# Patient Record
Sex: MALE | Race: WHITE | NOT HISPANIC OR LATINO | Employment: OTHER | URBAN - METROPOLITAN AREA
[De-identification: names, ages, dates, MRNs, and addresses within clinical notes are randomized per-mention and may not be internally consistent; named-entity substitution may affect disease eponyms.]

---

## 2017-06-07 ENCOUNTER — OFFICE VISIT (OUTPATIENT)
Dept: CARDIOLOGY | Facility: CLINIC | Age: 77
End: 2017-06-07

## 2017-06-07 VITALS
SYSTOLIC BLOOD PRESSURE: 128 MMHG | DIASTOLIC BLOOD PRESSURE: 64 MMHG | BODY MASS INDEX: 27.61 KG/M2 | WEIGHT: 186.4 LBS | HEART RATE: 70 BPM | HEIGHT: 69 IN

## 2017-06-07 DIAGNOSIS — E78.00 PURE HYPERCHOLESTEROLEMIA: ICD-10-CM

## 2017-06-07 DIAGNOSIS — I10 ESSENTIAL HYPERTENSION: ICD-10-CM

## 2017-06-07 DIAGNOSIS — I25.10 CORONARY ARTERY DISEASE INVOLVING NATIVE CORONARY ARTERY OF NATIVE HEART WITHOUT ANGINA PECTORIS: Primary | ICD-10-CM

## 2017-06-07 PROCEDURE — 99213 OFFICE O/P EST LOW 20 MIN: CPT | Performed by: INTERNAL MEDICINE

## 2017-06-07 RX ORDER — FLUTICASONE PROPIONATE 50 MCG
2 SPRAY, SUSPENSION (ML) NASAL DAILY
COMMUNITY

## 2017-06-07 RX ORDER — CETIRIZINE HYDROCHLORIDE 10 MG/1
10 TABLET ORAL DAILY
COMMUNITY

## 2017-06-07 RX ORDER — ASPIRIN 325 MG
81 TABLET ORAL DAILY
COMMUNITY
End: 2018-06-13 | Stop reason: DRUGHIGH

## 2017-06-07 NOTE — PROGRESS NOTES
Subjective:     Encounter Date:06/07/2017      Patient ID: Jimi Garcia is a 77 y.o. male.    Chief Complaint: No chief complaint on file.      PROBLEM LIST:  1. Coronary artery disease  a. A 7/ 2001 angina cardiac catheter 70% left main 60% LAD 90% left circumflex EF 45  b. 8/01 CABG ([facility]) LIMA to LAD vein graft to diagonal vein graft to OM1 and SVG to PDA  c. 10/01 cardiac catheter normal LVEF occluded SVG to diagonal.  95% diagonal (2.5 x 9 RIVERA)  d. Angina, 3/7, patent LIMA to LAD, SVG ×2 occluded ostial LCx 90% (4 x 15 ) obtuse marginal 90% (3.0 x 12 ) 99% left PDA treated medically  e. 4/11 cardiac catheter 100% RCA and 100% SVG's.  Patent LIMA.  Patent stents.  f. 4/13 patent LIMA 100% ramus with collaterals 100% SVGs.  100% RCA with collaterals LVEF 55  2. Hypertension  3. Diabetes  4. Dyslipidemia  5. Low testosterone  6. Alzheimer's dementia  7. GERD  8. Past surgical history  Deviated septum repair   lumbar spine surgery      History of Present Illness  Patient returns today for follow up with a history of cor artery disease.  He is seen for his annual follow-up and overall from a cardiovascular standpoint.  Doing well.  He has no chest pain shortness breath orthopnea PND claudication any cardio vascular symptoms.  His main issues per his wife or his intermittent memory issues from Alzheimer's..     Allergies   Allergen Reactions   • Aricept [Donepezil Hcl]    • Cefdinir    • Doxycycline Nausea Only   • Sulfa Antibiotics    • Vibramycin [Doxycycline Calcium] Nausea Only         Current Outpatient Prescriptions:   •  aspirin 325 MG tablet, Take 325 mg by mouth Daily., Disp: , Rfl:   •  atorvastatin (LIPITOR) 20 MG tablet, Take 20 mg by mouth daily., Disp: , Rfl:   •  cetirizine (zyrTEC) 10 MG tablet, Take 10 mg by mouth Daily., Disp: , Rfl:   •  clopidogrel (PLAVIX) 75 MG tablet, Take 75 mg by mouth daily., Disp: , Rfl:   •  Cyanocobalamin 1000 MCG/ML kit, Inject  as directed Every 30  "(Thirty) Days., Disp: , Rfl:   •  fluticasone (FLONASE) 50 MCG/ACT nasal spray, 2 sprays into each nostril Daily., Disp: , Rfl:   •  lisinopril (PRINIVIL,ZESTRIL) 5 MG tablet, Take 5 mg by mouth daily., Disp: , Rfl:   •  memantine (NAMENDA XR) 28 MG capsule sustained-release 24 hr extended release capsule, Take 28 mg by mouth daily., Disp: , Rfl:   •  metFORMIN (GLUCOPHAGE) 1000 MG tablet, Take 1,000 mg by mouth 2 (two) times a day with meals., Disp: , Rfl:   •  metoprolol tartrate (LOPRESSOR) 50 MG tablet, Take 50 mg by mouth 2 (two) times a day., Disp: , Rfl:   •  Multiple Vitamins-Minerals (MULTIVITAMIN ADULTS PO), Take  by mouth., Disp: , Rfl:   •  nitroglycerin (NITROSTAT) 0.4 MG SL tablet, Place 0.4 mg under the tongue every 5 (five) minutes as needed for chest pain. Take no more than 3 doses in 15 minutes., Disp: , Rfl:   •  omeprazole (PriLOSEC) 40 MG capsule, Take 40 mg by mouth 2 (two) times a day., Disp: , Rfl:   •  saxagliptin (ONGLYZA) 5 MG tablet, Take 5 mg by mouth daily., Disp: , Rfl:   •  TACRINE HYDROCHLORIDE PO, Take  by mouth., Disp: , Rfl:     The following portions of the patient's history were reviewed and updated as appropriate: allergies, current medications, past family history, past medical history, past social history, past surgical history and problem list.    Review of Systems   Constitution: Negative.   Cardiovascular: Negative.    Respiratory: Negative.    Hematologic/Lymphatic: Negative for bleeding problem. Does not bruise/bleed easily.   Skin: Negative for rash.   Musculoskeletal: Positive for arthritis. Negative for muscle weakness and myalgias.   Gastrointestinal: Negative for heartburn, nausea and vomiting.   Neurological: Negative.    Psychiatric/Behavioral: Positive for memory loss.          Objective:   Blood pressure 128/64, pulse 70, height 69\" (175.3 cm), weight 186 lb 6.4 oz (84.6 kg).      Physical Exam   Constitutional: He is oriented to person, place, and time. He " appears well-developed and well-nourished.   HENT:   Mouth/Throat: Oropharynx is clear and moist.   Neck: No JVD present. Carotid bruit is not present. No thyromegaly present.   Cardiovascular: Regular rhythm, S1 normal, S2 normal, normal heart sounds and intact distal pulses.  Exam reveals no gallop, no S3 and no S4.    No murmur heard.  Pulses:       Carotid pulses are 2+ on the right side, and 2+ on the left side.       Radial pulses are 2+ on the right side, and 2+ on the left side.   Pulmonary/Chest: Breath sounds normal.   Abdominal: Soft. Bowel sounds are normal. He exhibits no mass. There is no tenderness.   Musculoskeletal: He exhibits no edema.   Neurological: He is alert and oriented to person, place, and time.   Skin: Skin is warm and dry. No rash noted.       Lab Review:    Procedures        Assessment:   Diagnoses and all orders for this visit:    Coronary artery disease involving native coronary artery of native heart without angina pectoris    Pure hypercholesterolemia    Essential hypertension      Impression  1. Coronary artery disease, stable, known severe disease, but no angina  2. Alzheimer's type dementia, this is his primary issue  3. Hypertension controlled  4. Dyslipidemia controlled on statin    Plan:  1. Continue current medical therapy  2. Revisit in 12 MO, or sooner as needed.    Tim Byrd MD

## 2018-06-11 NOTE — PROGRESS NOTES
Subjective:     Encounter Date:06/13/2018    Primary Care Physician: Dick Valero MD      Patient ID: Jimi Garcia is a 78 y.o. male.    Chief Complaint:No chief complaint on file.    PROBLEM LIST:  1. Coronary artery disease  a. A 7/ 2001 angina cardiac catheter 70% left main 60% LAD 90% left circumflex EF 45  b. 8/01 CABG ([facility]) LIMA to LAD vein graft to diagonal vein graft to OM1 and SVG to PDA  c. 10/01 cardiac catheter normal LVEF occluded SVG to diagonal.  95% diagonal (2.5 x 9 RIVERA)  d. Angina, 3/7, patent LIMA to LAD, SVG ×2 occluded ostial LCx 90% (4 x 15 ) obtuse marginal 90% (3.0 x 12 ) 99% left PDA treated medically  e. 4/11 cardiac catheter 100% RCA and 100% SVG's.  Patent LIMA.  Patent stents.  f. 4/13 patent LIMA 100% ramus with collaterals 100% SVGs.  100% RCA with collaterals LVEF 55  2. Hypertension  3. Diabetes  4. Dyslipidemia  5. Low testosterone  6. Alzheimer's dementia  7. GERD  8. Arthritis   9. Past surgical history:  a. Deviated septum repair  b. Lumbar spine surgery    Allergies   Allergen Reactions   • Aricept [Donepezil Hcl]    • Cefdinir    • Doxycycline Nausea Only   • Sulfa Antibiotics    • Vibramycin [Doxycycline Calcium] Nausea Only         Current Outpatient Prescriptions:   •  aspirin 325 MG tablet, Take 325 mg by mouth Daily., Disp: , Rfl:   •  atorvastatin (LIPITOR) 20 MG tablet, Take 20 mg by mouth daily., Disp: , Rfl:   •  cetirizine (zyrTEC) 10 MG tablet, Take 10 mg by mouth Daily., Disp: , Rfl:   •  clopidogrel (PLAVIX) 75 MG tablet, Take 75 mg by mouth daily., Disp: , Rfl:   •  Cyanocobalamin 1000 MCG/ML kit, Inject  as directed Every 30 (Thirty) Days., Disp: , Rfl:   •  fluticasone (FLONASE) 50 MCG/ACT nasal spray, 2 sprays into each nostril Daily., Disp: , Rfl:   •  lisinopril (PRINIVIL,ZESTRIL) 5 MG tablet, Take 5 mg by mouth daily., Disp: , Rfl:   •  memantine (NAMENDA XR) 28 MG capsule sustained-release 24 hr extended release capsule, Take 28 mg by  mouth daily., Disp: , Rfl:   •  metFORMIN (GLUCOPHAGE) 1000 MG tablet, Take 1,000 mg by mouth 2 (two) times a day with meals., Disp: , Rfl:   •  metoprolol tartrate (LOPRESSOR) 50 MG tablet, Take 50 mg by mouth 2 (two) times a day., Disp: , Rfl:   •  Multiple Vitamins-Minerals (MULTIVITAMIN ADULTS PO), Take  by mouth., Disp: , Rfl:   •  nitroglycerin (NITROSTAT) 0.4 MG SL tablet, Place 0.4 mg under the tongue every 5 (five) minutes as needed for chest pain. Take no more than 3 doses in 15 minutes., Disp: , Rfl:   •  omeprazole (PriLOSEC) 40 MG capsule, Take 40 mg by mouth 2 (two) times a day., Disp: , Rfl:   •  saxagliptin (ONGLYZA) 5 MG tablet, Take 5 mg by mouth daily., Disp: , Rfl:   •  TACRINE HYDROCHLORIDE PO, Take  by mouth., Disp: , Rfl:         History of Present Illness    Patient returns today for annual follow-up was coronary artery disease.  Since her last visit he is overall doing very well.  He has no change in his cardiovascular complaints.  Still notes his primary issue his memory pain orthopnea PND dyspnea on exertion presyncope syncope or tachycardia palpitations.    The following portions of the patient's history were reviewed and updated as appropriate: allergies, current medications, past family history, past medical history, past social history, past surgical history and problem list.      Social History   Substance Use Topics   • Smoking status: Former Smoker     Quit date: 1990   • Smokeless tobacco: Never Used      Comment: quit 1990   • Alcohol use No         Review of Systems   Constitution: Negative.   Cardiovascular: Negative.    Respiratory: Negative.    Hematologic/Lymphatic: Negative for bleeding problem. Does not bruise/bleed easily.   Skin: Negative for rash.   Musculoskeletal: Positive for arthritis. Negative for muscle weakness and myalgias.   Gastrointestinal: Negative for heartburn, nausea and vomiting.   Neurological: Negative.    Psychiatric/Behavioral: Positive for memory  loss.          Objective:    vitals were not taken for this visit.        Physical Exam   Constitutional: He is oriented to person, place, and time. He appears well-developed and well-nourished.   HENT:   Mouth/Throat: Oropharynx is clear and moist.   Neck: No JVD present. Carotid bruit is not present. No thyromegaly present.   Cardiovascular: Regular rhythm, S1 normal, S2 normal, normal heart sounds and intact distal pulses.  Exam reveals no gallop, no S3 and no S4.    No murmur heard.  Pulses:       Carotid pulses are 2+ on the right side, and 2+ on the left side.       Radial pulses are 2+ on the right side, and 2+ on the left side.   Pulmonary/Chest: Breath sounds normal.   Abdominal: Soft. Bowel sounds are normal. He exhibits no mass. There is no tenderness.   Musculoskeletal: He exhibits no edema.   Neurological: He is alert and oriented to person, place, and time.   Skin: Skin is warm and dry. No rash noted.       Procedures          Assessment:   Assessment/Plan      Jimi was seen today for coronary artery disease, hyperlipidemia and hypertension.    Diagnoses and all orders for this visit:    Coronary artery disease involving native coronary artery of native heart without angina pectoris    Pure hypercholesterolemia      1.  Coronary artery disease stable no angina  2.  Hypertension well controlled, blood pressure is somewhat low today.  3.  Dyslipidemia on statin  .  Mild Alzheimer's type dementia    Recommendations: Decrease aspirin 81 mg daily.  Given the patient's age, comorbidities, and relatively hypotension, we will discontinue his lisinopril.  No other changes, revisit annually or when necessary symptom change     Tim Byrd MD      Dictated utilizing Dragon dictation

## 2018-06-13 ENCOUNTER — OFFICE VISIT (OUTPATIENT)
Dept: CARDIOLOGY | Facility: CLINIC | Age: 78
End: 2018-06-13

## 2018-06-13 VITALS
BODY MASS INDEX: 27.47 KG/M2 | DIASTOLIC BLOOD PRESSURE: 56 MMHG | WEIGHT: 186 LBS | SYSTOLIC BLOOD PRESSURE: 106 MMHG | HEART RATE: 64 BPM

## 2018-06-13 DIAGNOSIS — I25.10 CORONARY ARTERY DISEASE INVOLVING NATIVE CORONARY ARTERY OF NATIVE HEART WITHOUT ANGINA PECTORIS: Primary | ICD-10-CM

## 2018-06-13 DIAGNOSIS — E78.00 PURE HYPERCHOLESTEROLEMIA: ICD-10-CM

## 2018-06-13 PROCEDURE — 99213 OFFICE O/P EST LOW 20 MIN: CPT | Performed by: INTERNAL MEDICINE

## 2018-06-13 RX ORDER — ASPIRIN 81 MG/1
81 TABLET ORAL DAILY
Qty: 30 TABLET | Refills: 11
Start: 2018-06-13

## 2019-07-01 NOTE — PROGRESS NOTES
Subjective:     Encounter Date:07/02/2019    Primary Care Physician: Dick Valero MD      Patient ID: Jimi Garcia is a 79 y.o. male.    Chief Complaint:Follow-up    PROBLEM LIST:  1. Coronary artery disease  a. A 7/ 2001 angina cardiac catheter 70% left main 60% LAD 90% left circumflex EF 45  b. 8/01 CABG ([facility]) LIMA to LAD vein graft to diagonal vein graft to OM1 and SVG to PDA  c. 10/01 cardiac catheter normal LVEF occluded SVG to diagonal.  95% diagonal (2.5 x 9 RIVERA)  d. Angina, 3/7, patent LIMA to LAD, SVG ×2 occluded ostial LCx 90% (4 x 15 ) obtuse marginal 90% (3.0 x 12 ) 99% left PDA treated medically  e. 4/11 cardiac catheter 100% RCA and 100% SVG's.  Patent LIMA.  Patent stents.  f. 4/13 patent LIMA 100% ramus with collaterals 100% SVGs.  100% RCA with collaterals LVEF 55  2. Hypertension  3. Diabetes  4. Dyslipidemia  5. Low testosterone  6. Alzheimer's dementia  7. GERD  8. Arthritis   9. Past surgical history:  a. Deviated septum repair  b. Lumbar spine surgery      Allergies   Allergen Reactions   • Aricept [Donepezil Hcl]    • Cefdinir    • Doxycycline Nausea Only   • Sulfa Antibiotics    • Vibramycin [Doxycycline Calcium] Nausea Only         Current Outpatient Medications:   •  aspirin 81 MG EC tablet, Take 1 tablet by mouth Daily., Disp: 30 tablet, Rfl: 11  •  atorvastatin (LIPITOR) 20 MG tablet, Take 20 mg by mouth daily., Disp: , Rfl:   •  cetirizine (zyrTEC) 10 MG tablet, Take 10 mg by mouth Daily., Disp: , Rfl:   •  clopidogrel (PLAVIX) 75 MG tablet, Take 75 mg by mouth daily., Disp: , Rfl:   •  Cyanocobalamin 1000 MCG/ML kit, Inject  as directed Every 30 (Thirty) Days., Disp: , Rfl:   •  fluticasone (FLONASE) 50 MCG/ACT nasal spray, 2 sprays into each nostril Daily., Disp: , Rfl:   •  memantine (NAMENDA XR) 28 MG capsule sustained-release 24 hr extended release capsule, Take 28 mg by mouth daily., Disp: , Rfl:   •  metFORMIN (GLUCOPHAGE) 1000 MG tablet, Take 1,000 mg by  mouth 2 (two) times a day with meals., Disp: , Rfl:   •  metoprolol tartrate (LOPRESSOR) 50 MG tablet, Take 50 mg by mouth 2 (two) times a day., Disp: , Rfl:   •  Multiple Vitamins-Minerals (MULTIVITAMIN ADULTS PO), Take  by mouth., Disp: , Rfl:   •  nitroglycerin (NITROSTAT) 0.4 MG SL tablet, Place 0.4 mg under the tongue every 5 (five) minutes as needed for chest pain. Take no more than 3 doses in 15 minutes., Disp: , Rfl:   •  pantoprazole (PROTONIX) 40 MG EC tablet, Take 40 mg by mouth Daily., Disp: , Rfl:   •  psyllium (METAMUCIL) 58.6 % packet, Take 1 packet by mouth Daily., Disp: , Rfl:   •  saxagliptin (ONGLYZA) 5 MG tablet, Take 5 mg by mouth daily., Disp: , Rfl:         History of Present Illness    Returns today for annual follow-up of coronary disease hypertension and dyslipidemia.  Since her last visit she has no new cardiovascular issues.  He feels well.  Denies shortness of breath chest pain exertional claudication or any cardiovascular symptoms.     The following portions of the patient's history were reviewed and updated as appropriate: allergies, current medications, past family history, past medical history, past social history, past surgical history and problem list.      Social History     Tobacco Use   • Smoking status: Former Smoker     Last attempt to quit:      Years since quittin.5   • Smokeless tobacco: Never Used   • Tobacco comment: quit    Substance Use Topics   • Alcohol use: No   • Drug use: No         Review of Systems   Constitution: Negative.   Cardiovascular: Negative.    Respiratory: Negative.    Hematologic/Lymphatic: Negative for bleeding problem. Bruises/bleeds easily.   Skin: Positive for skin cancer and suspicious lesions. Negative for rash.   Musculoskeletal: Negative for muscle weakness and myalgias.   Gastrointestinal: Negative for heartburn, nausea and vomiting.   Neurological: Positive for difficulty with concentration.   Psychiatric/Behavioral: Positive  for hypervigilance.          Objective:    weight is 82.1 kg (181 lb). His blood pressure is 122/50 and his pulse is 75. His oxygen saturation is 97%.         Physical Exam   Constitutional: He is oriented to person, place, and time. He appears well-developed and well-nourished.   HENT:   Mouth/Throat: Oropharynx is clear and moist.   Neck: No JVD present. Carotid bruit is not present. No thyromegaly present.   Cardiovascular: Regular rhythm, S1 normal, S2 normal, normal heart sounds and intact distal pulses. Exam reveals no gallop, no S3 and no S4.   No murmur heard.  Pulses:       Carotid pulses are 2+ on the right side, and 2+ on the left side.       Radial pulses are 2+ on the right side, and 2+ on the left side.   Pulmonary/Chest: Breath sounds normal.   Abdominal: Soft. Bowel sounds are normal. He exhibits no mass. There is no tenderness.   Musculoskeletal: He exhibits no edema.   Neurological: He is alert and oriented to person, place, and time.   Skin: Skin is warm and dry. No rash noted.       Procedures          Assessment:   Assessment/Plan      Jimi was seen today for follow-up.    Diagnoses and all orders for this visit:    Coronary artery disease involving native coronary artery of native heart without angina pectoris    Pure hypercholesterolemia    Essential hypertension      1.  Coronary artery disease, status post remote CABG.  Currently without angina.  We will continue current medical therapy.  2.  Hypertension well-controlled on current medical therapy will make no changes  3.  Dyslipidemia last LDL of 62, continue current statin dose  4.  Diabetes last hemoglobin A1c is 7.3  5.  Alzheimer's type dementia, slowly progressing.    Recommendations overall the patient is stable today.  We will make no changes medical therapy.  Continue to revisit annually or as needed symptom change     Tim Byrd MD      Dictated utilizing Dragon dictation

## 2019-07-02 ENCOUNTER — OFFICE VISIT (OUTPATIENT)
Dept: CARDIOLOGY | Facility: CLINIC | Age: 79
End: 2019-07-02

## 2019-07-02 VITALS
DIASTOLIC BLOOD PRESSURE: 50 MMHG | WEIGHT: 181 LBS | SYSTOLIC BLOOD PRESSURE: 122 MMHG | OXYGEN SATURATION: 97 % | HEART RATE: 75 BPM | BODY MASS INDEX: 26.73 KG/M2

## 2019-07-02 DIAGNOSIS — I25.10 CORONARY ARTERY DISEASE INVOLVING NATIVE CORONARY ARTERY OF NATIVE HEART WITHOUT ANGINA PECTORIS: Primary | ICD-10-CM

## 2019-07-02 DIAGNOSIS — I10 ESSENTIAL HYPERTENSION: ICD-10-CM

## 2019-07-02 DIAGNOSIS — E78.00 PURE HYPERCHOLESTEROLEMIA: ICD-10-CM

## 2019-07-02 PROCEDURE — 99213 OFFICE O/P EST LOW 20 MIN: CPT | Performed by: INTERNAL MEDICINE

## 2019-07-02 RX ORDER — PANTOPRAZOLE SODIUM 40 MG/1
40 TABLET, DELAYED RELEASE ORAL DAILY
COMMUNITY

## 2020-07-15 ENCOUNTER — OFFICE VISIT (OUTPATIENT)
Dept: CARDIOLOGY | Facility: CLINIC | Age: 80
End: 2020-07-15

## 2020-07-15 VITALS
BODY MASS INDEX: 28.82 KG/M2 | DIASTOLIC BLOOD PRESSURE: 50 MMHG | HEIGHT: 65 IN | HEART RATE: 60 BPM | SYSTOLIC BLOOD PRESSURE: 102 MMHG | WEIGHT: 173 LBS

## 2020-07-15 DIAGNOSIS — I10 ESSENTIAL HYPERTENSION: ICD-10-CM

## 2020-07-15 DIAGNOSIS — E78.00 PURE HYPERCHOLESTEROLEMIA: ICD-10-CM

## 2020-07-15 DIAGNOSIS — I25.10 CORONARY ARTERY DISEASE INVOLVING NATIVE CORONARY ARTERY OF NATIVE HEART WITHOUT ANGINA PECTORIS: Primary | ICD-10-CM

## 2020-07-15 PROCEDURE — 99213 OFFICE O/P EST LOW 20 MIN: CPT | Performed by: INTERNAL MEDICINE

## 2020-07-15 RX ORDER — PHENOL 1.4 %
600 AEROSOL, SPRAY (ML) MUCOUS MEMBRANE DAILY
COMMUNITY

## 2020-07-15 RX ORDER — PAROXETINE 10 MG/1
10 TABLET, FILM COATED ORAL NIGHTLY
COMMUNITY

## 2020-07-15 RX ORDER — LANOLIN ALCOHOL/MO/W.PET/CERES
1000 CREAM (GRAM) TOPICAL DAILY
COMMUNITY

## 2020-07-15 NOTE — PROGRESS NOTES
Subjective:     Encounter Date:07/15/2020    Primary Care Physician: Dick Valero MD      Patient ID: Jimi Garcia is a 80 y.o. male.    Chief Complaint:Follow-up    PROBLEM LIST:  1. Coronary artery disease  a. A 7/ 2001 angina cardiac catheter 70% left main 60% LAD 90% left circumflex EF 45  b. 8/01 CABG ([facility]) LIMA to LAD vein graft to diagonal vein graft to OM1 and SVG to PDA  c. 10/01 cardiac catheter normal LVEF occluded SVG to diagonal.  95% diagonal (2.5 x 9 RIVERA)  d. Angina, 3/7, patent LIMA to LAD, SVG ×2 occluded ostial LCx 90% (4 x 15 ) obtuse marginal 90% (3.0 x 12 ) 99% left PDA treated medically  e. 4/11 cardiac catheter 100% RCA and 100% SVG's.  Patent LIMA.  Patent stents.  f. 4/13 patent LIMA 100% ramus with collaterals 100% SVGs.  100% RCA with collaterals LVEF 55  2. Hypertension  3. Diabetes  4. Dyslipidemia  5. Low testosterone  6. Alzheimer's dementia  7. GERD  8. Arthritis   9. Past surgical history:  a. Deviated septum repair  b. Lumbar spine surgery      Allergies   Allergen Reactions   • Aricept [Donepezil Hcl]    • Cefdinir    • Doxycycline Nausea Only   • Sulfa Antibiotics    • Vibramycin [Doxycycline Calcium] Nausea Only         Current Outpatient Medications:   •  aspirin 81 MG EC tablet, Take 1 tablet by mouth Daily., Disp: 30 tablet, Rfl: 11  •  atorvastatin (LIPITOR) 20 MG tablet, Take 20 mg by mouth daily., Disp: , Rfl:   •  calcium carbonate (OS-ANNA) 600 MG tablet, Take 600 mg by mouth Daily., Disp: , Rfl:   •  cetirizine (zyrTEC) 10 MG tablet, Take 10 mg by mouth Daily., Disp: , Rfl:   •  clopidogrel (PLAVIX) 75 MG tablet, Take 75 mg by mouth daily., Disp: , Rfl:   •  fluticasone (FLONASE) 50 MCG/ACT nasal spray, 2 sprays into each nostril Daily., Disp: , Rfl:   •  memantine (NAMENDA XR) 28 MG capsule sustained-release 24 hr extended release capsule, Take 28 mg by mouth daily., Disp: , Rfl:   •  metFORMIN (GLUCOPHAGE) 1000 MG tablet, Take 1,000 mg by mouth 2  (two) times a day with meals., Disp: , Rfl:   •  metoprolol tartrate (LOPRESSOR) 50 MG tablet, Take 50 mg by mouth 2 (two) times a day., Disp: , Rfl:   •  Multiple Vitamins-Minerals (MULTIVITAMIN ADULTS PO), Take  by mouth., Disp: , Rfl:   •  nitroglycerin (NITROSTAT) 0.4 MG SL tablet, Place 0.4 mg under the tongue every 5 (five) minutes as needed for chest pain. Take no more than 3 doses in 15 minutes., Disp: , Rfl:   •  pantoprazole (PROTONIX) 40 MG EC tablet, Take 40 mg by mouth Daily., Disp: , Rfl:   •  PARoxetine (PAXIL) 10 MG tablet, Take 10 mg by mouth Every Night., Disp: , Rfl:   •  psyllium (METAMUCIL) 58.6 % packet, Take 1 packet by mouth Daily., Disp: , Rfl:   •  saxagliptin (ONGLYZA) 5 MG tablet, Take 5 mg by mouth daily., Disp: , Rfl:   •  vitamin B-12 (CYANOCOBALAMIN) 1000 MCG tablet, Take 1,000 mcg by mouth Daily., Disp: , Rfl:   •  Cyanocobalamin 1000 MCG/ML kit, Inject  as directed Every 30 (Thirty) Days., Disp: , Rfl:         History of Present Illness    Patient returns today for follow-up of coronary artery disease and coronary risk factors.  Since her last visit, overall is doing very well.  Is wife notes that he has had some continued progressive cognitive decline.  He has an occasional dizziness, particularly wakes up in the morning, and after his morning medications.  Denies any presyncope or syncope.  Denies chest pain or dyspnea.    The following portions of the patient's history were reviewed and updated as appropriate: allergies, current medications, past family history, past medical history, past social history, past surgical history and problem list.      Social History     Tobacco Use   • Smoking status: Former Smoker     Last attempt to quit:      Years since quittin.5   • Smokeless tobacco: Never Used   • Tobacco comment: quit    Substance Use Topics   • Alcohol use: No   • Drug use: No         Review of Systems   Constitution: Negative.   Cardiovascular: Negative.   "Negative for chest pain, dyspnea on exertion, leg swelling, palpitations and syncope.   Respiratory: Negative.  Negative for shortness of breath.    Hematologic/Lymphatic: Negative for bleeding problem. Does not bruise/bleed easily.   Skin: Negative for rash.   Musculoskeletal: Negative for muscle weakness and myalgias.   Gastrointestinal: Negative for heartburn, nausea and vomiting.   Neurological: Negative.  Negative for dizziness, light-headedness, loss of balance and numbness.   Psychiatric/Behavioral: Positive for memory loss.          Objective:   /50 (BP Location: Right arm)   Pulse 60   Ht 165.1 cm (65\")   Wt 78.5 kg (173 lb)   BMI 28.79 kg/m²         Physical Exam   Constitutional: He is oriented to person, place, and time. He appears well-developed and well-nourished.   HENT:   Mouth/Throat: Oropharynx is clear and moist.   Neck: No JVD present. Carotid bruit is not present. No thyromegaly present.   Cardiovascular: Regular rhythm, S1 normal, S2 normal, normal heart sounds and intact distal pulses. Exam reveals no gallop, no S3 and no S4.   No murmur heard.  Pulses:       Carotid pulses are 2+ on the right side, and 2+ on the left side.       Radial pulses are 2+ on the right side, and 2+ on the left side.   Pulmonary/Chest: Breath sounds normal.   Abdominal: Soft. Bowel sounds are normal. He exhibits no mass. There is no tenderness.   Musculoskeletal: He exhibits no edema.   Neurological: He is alert and oriented to person, place, and time.   Skin: Skin is warm and dry. No rash noted.       Procedures          Assessment:   Assessment/Plan      Jimi was seen today for follow-up.    Diagnoses and all orders for this visit:    Coronary artery disease involving native coronary artery of native heart without angina pectoris    Pure hypercholesterolemia    Essential hypertension      1.  Coronary artery disease, stable without angina  2.  Dyslipidemia last LDL 70 on current statin dose.  3.  " History of hypertension now relatively low blood pressure with some symptomatic dizziness.  4.  Progressive Alzheimer's type dementia    Recommendations 1.  Change metoprolol tartrate 25 twice daily to metoprolol succinate 25 mg once daily.  2.  Otherwise continue current medical therapy  3.  Revisit annually apparent symptom change       Tim Byrd MD    Dictated utilizing Dragon dictation

## 2020-07-16 RX ORDER — METOPROLOL SUCCINATE 25 MG/1
25 TABLET, EXTENDED RELEASE ORAL DAILY
Qty: 30 TABLET | Refills: 11 | Status: SHIPPED | OUTPATIENT
Start: 2020-07-16

## 2020-07-16 RX ORDER — METOPROLOL SUCCINATE 25 MG/1
25 TABLET, EXTENDED RELEASE ORAL DAILY
Qty: 90 TABLET | Refills: 1 | Status: SHIPPED | OUTPATIENT
Start: 2020-07-16 | End: 2020-11-06

## 2020-11-06 RX ORDER — METOPROLOL SUCCINATE 25 MG/1
25 TABLET, EXTENDED RELEASE ORAL DAILY
Qty: 90 TABLET | Refills: 3 | Status: SHIPPED | OUTPATIENT
Start: 2020-11-06 | End: 2022-01-11 | Stop reason: SDUPTHER

## 2021-11-04 RX ORDER — METOPROLOL SUCCINATE 25 MG/1
TABLET, EXTENDED RELEASE ORAL
Qty: 90 TABLET | Refills: 3 | OUTPATIENT
Start: 2021-11-04

## 2022-01-11 RX ORDER — METOPROLOL SUCCINATE 25 MG/1
TABLET, EXTENDED RELEASE ORAL
Qty: 90 TABLET | Refills: 0 | Status: SHIPPED | OUTPATIENT
Start: 2022-01-11

## 2022-03-14 RX ORDER — METOPROLOL SUCCINATE 25 MG/1
TABLET, EXTENDED RELEASE ORAL
Qty: 90 TABLET | Refills: 3 | OUTPATIENT
Start: 2022-03-14